# Patient Record
Sex: MALE | Race: WHITE | URBAN - METROPOLITAN AREA
[De-identification: names, ages, dates, MRNs, and addresses within clinical notes are randomized per-mention and may not be internally consistent; named-entity substitution may affect disease eponyms.]

---

## 2024-10-03 DIAGNOSIS — R26.9 GAIT ABNORMALITY: Primary | ICD-10-CM

## 2024-10-21 ENCOUNTER — TELEPHONE (OUTPATIENT)
Dept: NEUROLOGY | Facility: CLINIC | Age: 74
End: 2024-10-21
Payer: MEDICARE

## 2024-10-21 NOTE — TELEPHONE ENCOUNTER
Returned Mrs. Mcgrath call regarding the test patient needed.  Since the referral on file does not show what type of test or the referring doctors name we have not schedule.  Ask her to tell me what test was ordered and who was the doctor.  As soon as information is verified we can schedule.

## 2024-10-22 ENCOUNTER — TELEPHONE (OUTPATIENT)
Dept: NEUROLOGY | Facility: CLINIC | Age: 74
End: 2024-10-22
Payer: MEDICARE

## 2024-10-22 NOTE — TELEPHONE ENCOUNTER
Returned call to Roseline/Dr. Robles's office and asked to have records faxed to us. Spoke with patients wife and scheduled appointment for second opinion on GBS for 11/14/24 at 8 am with Dr. Mancia. Wife states she thought Dr. Robles just wanted EMG. Advised her nurse left a message and said it is for a second opinion and Dr. Robles wants patient evaluated, not an EMG. Patients wife asking if appointment would be covered by insurance. Provided phone number to central [employer] to confirm.

## 2024-10-22 NOTE — TELEPHONE ENCOUNTER
Returned  Francois's call regarding scheduling an EMG study,  I explained, I would need call Dr. Betsy Robles's office to clarify orders, since there is only a Neurology consult.    Called Dr. Robles's office and left message for clarification of what the patient is referred to [facility] and if is for an EMG, asked for order to be faxed.

## 2024-10-22 NOTE — TELEPHONE ENCOUNTER
----- Message from Jada sent at 10/22/2024  1:56 PM CDT -----  Regarding: Needs Medical Advice  Contact: jass with Dr. Robles's office at [phone]  Type: Needs Medical Advice  Who Called:  jass with Dr. Robles's office at [phone]     Additional Information: calling for patient's referral in the system. It is not for an EMG but a second opinion consultation for GBS. Dr. Robles would like the patient evaluated. Please call and advise. Thank you

## 2024-10-28 ENCOUNTER — TELEPHONE (OUTPATIENT)
Dept: NEUROLOGY | Facility: CLINIC | Age: 74
End: 2024-10-28
Payer: MEDICARE

## 2024-11-14 ENCOUNTER — OFFICE VISIT (OUTPATIENT)
Dept: NEUROLOGY | Facility: CLINIC | Age: 74
End: 2024-11-14
Payer: MEDICARE

## 2024-11-14 ENCOUNTER — LAB VISIT (OUTPATIENT)
Dept: LAB | Facility: HOSPITAL | Age: 74
End: 2024-11-14
Attending: PSYCHIATRY & NEUROLOGY
Payer: MEDICARE

## 2024-11-14 VITALS
SYSTOLIC BLOOD PRESSURE: 180 MMHG | RESPIRATION RATE: 17 BRPM | HEIGHT: 60 IN | BODY MASS INDEX: 53.19 KG/M2 | WEIGHT: 270.94 LBS | DIASTOLIC BLOOD PRESSURE: 88 MMHG | HEART RATE: 70 BPM

## 2024-11-14 DIAGNOSIS — R25.1 TREMOR: ICD-10-CM

## 2024-11-14 DIAGNOSIS — G25.9 EXTRAPYRAMIDAL AND MOVEMENT DISORDER, UNSPECIFIED: ICD-10-CM

## 2024-11-14 DIAGNOSIS — R29.6 FALLS: ICD-10-CM

## 2024-11-14 DIAGNOSIS — M54.16 LUMBAR RADICULOPATHY: ICD-10-CM

## 2024-11-14 DIAGNOSIS — R55 SYNCOPE, UNSPECIFIED SYNCOPE TYPE: ICD-10-CM

## 2024-11-14 DIAGNOSIS — M54.2 CERVICALGIA: ICD-10-CM

## 2024-11-14 DIAGNOSIS — R55 SYNCOPE, UNSPECIFIED SYNCOPE TYPE: Primary | ICD-10-CM

## 2024-11-14 DIAGNOSIS — R26.9 GAIT ABNORMALITY: ICD-10-CM

## 2024-11-14 LAB
ALBUMIN SERPL BCP-MCNC: 3.9 G/DL (ref 3.5–5.2)
ALP SERPL-CCNC: 106 U/L (ref 40–150)
ALT SERPL W/O P-5'-P-CCNC: 45 U/L (ref 10–44)
ANION GAP SERPL CALC-SCNC: 10 MMOL/L (ref 8–16)
AST SERPL-CCNC: 51 U/L (ref 10–40)
BILIRUB SERPL-MCNC: 0.8 MG/DL (ref 0.1–1)
BUN SERPL-MCNC: 10 MG/DL (ref 8–23)
CALCIUM SERPL-MCNC: 9.4 MG/DL (ref 8.7–10.5)
CERULOPLASMIN SERPL-MCNC: 19 MG/DL (ref 15–45)
CHLORIDE SERPL-SCNC: 105 MMOL/L (ref 95–110)
CK SERPL-CCNC: 142 U/L (ref 20–200)
CO2 SERPL-SCNC: 26 MMOL/L (ref 23–29)
CREAT SERPL-MCNC: 1.4 MG/DL (ref 0.5–1.4)
EST. GFR  (NO RACE VARIABLE): 52.1 ML/MIN/1.73 M^2
FOLATE SERPL-MCNC: 17.7 NG/ML (ref 4–24)
GLUCOSE SERPL-MCNC: 94 MG/DL (ref 70–110)
POTASSIUM SERPL-SCNC: 4.1 MMOL/L (ref 3.5–5.1)
PROT SERPL-MCNC: 7.6 G/DL (ref 6–8.4)
SODIUM SERPL-SCNC: 141 MMOL/L (ref 136–145)
VIT B12 SERPL-MCNC: 966 PG/ML (ref 210–950)

## 2024-11-14 PROCEDURE — 84446 ASSAY OF VITAMIN E: CPT | Performed by: PSYCHIATRY & NEUROLOGY

## 2024-11-14 PROCEDURE — 36415 COLL VENOUS BLD VENIPUNCTURE: CPT | Mod: PO | Performed by: PSYCHIATRY & NEUROLOGY

## 2024-11-14 PROCEDURE — 82390 ASSAY OF CERULOPLASMIN: CPT | Performed by: PSYCHIATRY & NEUROLOGY

## 2024-11-14 PROCEDURE — 82525 ASSAY OF COPPER: CPT | Performed by: PSYCHIATRY & NEUROLOGY

## 2024-11-14 PROCEDURE — 83921 ORGANIC ACID SINGLE QUANT: CPT | Performed by: PSYCHIATRY & NEUROLOGY

## 2024-11-14 PROCEDURE — 80053 COMPREHEN METABOLIC PANEL: CPT | Performed by: PSYCHIATRY & NEUROLOGY

## 2024-11-14 PROCEDURE — 82607 VITAMIN B-12: CPT | Performed by: PSYCHIATRY & NEUROLOGY

## 2024-11-14 PROCEDURE — 82085 ASSAY OF ALDOLASE: CPT | Performed by: PSYCHIATRY & NEUROLOGY

## 2024-11-14 PROCEDURE — 99213 OFFICE O/P EST LOW 20 MIN: CPT | Mod: PBBFAC,PO | Performed by: PSYCHIATRY & NEUROLOGY

## 2024-11-14 PROCEDURE — 84425 ASSAY OF VITAMIN B-1: CPT | Performed by: PSYCHIATRY & NEUROLOGY

## 2024-11-14 PROCEDURE — 99999 PR PBB SHADOW E&M-EST. PATIENT-LVL III: CPT | Mod: PBBFAC,,, | Performed by: PSYCHIATRY & NEUROLOGY

## 2024-11-14 PROCEDURE — 85025 COMPLETE CBC W/AUTO DIFF WBC: CPT | Performed by: PSYCHIATRY & NEUROLOGY

## 2024-11-14 PROCEDURE — 84630 ASSAY OF ZINC: CPT | Performed by: PSYCHIATRY & NEUROLOGY

## 2024-11-14 PROCEDURE — 82746 ASSAY OF FOLIC ACID SERUM: CPT | Performed by: PSYCHIATRY & NEUROLOGY

## 2024-11-14 PROCEDURE — 82550 ASSAY OF CK (CPK): CPT | Performed by: PSYCHIATRY & NEUROLOGY

## 2024-11-14 NOTE — PATIENT INSTRUCTIONS
Will get blood work today to look for things that might contribute to dizziness or tremor.    Advise a tilt table test with your cardiologist.    You do not have guillain barre syndrome.  It does not appear that your symptoms are occurring due to a neuromuscular disorder.

## 2024-11-14 NOTE — PROGRESS NOTES
NEUROLOGY  Outpatient CONSULT  [facility]  [address] [facility] [address], [place] [address]  [phone] (office) / [phone] (fax)    Patient Name:  Guillermo Parrish  :  1950  MR #:  [id number]  Acct #:  [id number]    Date of Neurology Consult: 2024  Name of Neurologist: Kindra Mancia D.O, ABPN, AOBNP, ABEM    Other Physicians:  No primary care provider on file. (Primary Care Physician); Betsy Robles MD (Referring)      Chief Complaint: Establish Care (Referred by betsy robles, unsteady gait, falls and tremors )      History of Present Illness (HPI):  Guillermo Parrish is a 74 y.o. male here for a second opinion regarding unsteady gait.  He has been following with Dr. Betsy Robles.    Patient's wife provides the history.  He had his 2nd COVID Booster in 2022.  Perfectly normal up until then.  A month later, he fainted and hit his head.  Then he started having poor balance.  He also has dizziness.  He continued to have episodes of syncope.  Since then he has had to use a rollater to walk.  He was seen by ENT and did the balance testing.  This was reportedly normal.  They think he was checked for seizures.  He had an MRI brain that showed signs of an old stroke.    He states he has a lot of trouble with dizziness.  He will get very dizzy even just sitting down watching TV.  He feels like he wants to pass out.  He will feel very lightheaded.  He has not had a tilt table test.  He tried to do a stress test but he almost passed out on the treadmill. He states he feels like he is going to fall, he gets short of breath and loopy.    An example of a fall, they were walking through a parking lot and he had to step up a small step.  He fell backwards trying to go up this step.  At home he was sitting at the bar and fell backwards at the [facility]to.  He was incoherent, his wife had to call 911.  He lost his bladder at the time.  Slowly he came around.    He can take a few steps if there  "is something to hold onto.  If he gets down on the floor he cannot get up.  He will crawl to something to climb up to get to a stand.      He has tremors in both arms.  He reports the shaking occurs at rest.  It comes and goes.  He shakes with activity as well.  He does not know if he has tried anything for tremor.    He also has a history of neck pain.  He was seen by pain management for this.  He had injections for this.  He had bulging discs in his back so had surgery for this. He can get a numbness in his left leg that will come and go, but when it occurs the leg "won't work".  This did not improve with surgery.  He also has some shoulder pain.    He has recently changed his diabetes medication and has been nauseous ever since.  He has not spoken to his PCP about this yet.    They feel he is worse now than he was when this started 2 years ago.  The tremors have escalated.  He can no longer go upstairs in his home.      He has no history of cancer.  He has never had stomach surgery.  He has never been a smoker.  He drinks 3-4 beers a day during the week, on the weekend it is more, this has been this was for decades.  He has no history of vitamin deficiencies, he takes a multivitamin daily.      He has neuropathy in the lower extremities, he recalls this has been present for decades.  He states he feels the numbness in the same area he has skin changes on his legs.    Treatment to date:    Not clear what he has tried    Review of Systems:   General: Weight gain: Yes, Weight Loss: No, Fatigue: Yes,   Fever: No, Chills: No, Night Sweats: No, Insomnia: No, Excessive sleeping: No   Respiratory:  Cough: Yes, Shortness of Breath: Yes,   Wheezing: No, Excessive Snoring: Yes, Coughing up blood: No  Endocrine: Heat Intolerance: No, Cold Intolerance: No,   Excessive Thirst: No, Excessive Hunger: No,   Eyes:  Blurred Vision: Yes, Double Vision: No,   Light Sensitivity: No, Eye pain: Yes  Musculoskeletal: Muscle Aches/Pain: " Yes, Joint Pain/Swelling: No, Muscle Cramps: Yes, Muscle Weakness: Yes, Neck Pain: Yes, Back Pain: No   Neurological: Difficulty Walking/Falls: Yes, Headache Migraine: No, Dizziness/Vertigo: Yes, Fainting: No, Difficulty with Speech: No, Weakness: No, Tingling/Numbness: Yes, Tremors: Yes, Memory Problems: No, Seizures: No, Difficulty Swallowing: No, Altered Taste: No.  Cardiovascular: Chest Pain: Yes, Shortness of Breath: No,   Palpitations: Yes,  Gastrointestinal: Nausea/Vomiting: No, Constipation: No, Diarrhea: No, Bloody Stools: No   Psych/Cog:  Depression: No, Anxiety: No, Hallucinations: No, Problems Concentrating: No  : Frequent Urination: No, Incontinence: No, Urinary Infections: No, Blood of Urine: No,   ENT:Hearing Loss: No, Earache: No, Ringing in Ears: Yes,   Facial Pain: No, Chronic Congestion: No   Immune: Seasonal Allergies: No, Hives and/or Rashes: No  The remainder of the review of twelve body systems was reviewed and normal.    Past Medical, Surgical, Family & Social History:   Past Medical History:   Diagnosis Date    Cancer     Diabetes mellitus     Hearing loss     Hypertension     Neuropathy      Past Surgical History:   Procedure Laterality Date    SINUS SURGERY      SPINE SURGERY       Family History   Problem Relation Name Age of Onset    Stroke Mother      Kidney disease Mother      Diabetes Mother      Heart disease Father      Aneurysm Father      Cancer Maternal Grandmother      Cancer Paternal Grandmother       Alcohol use:  reports current alcohol use of about 4.0 standard drinks of alcohol per week.   (Of note, 0.6 oz = 1 beer or 6 oz = 10 beers).  Tobacco use:  reports that he has been smoking cigarettes. He uses smokeless tobacco.  Street drug use:  has no history on file for drug use.  Allergies: Hydrocodone-acetaminophen.    Home Medications:     Current Outpatient Medications:     aspirin (ECOTRIN) 81 MG EC tablet, Take 81 mg by mouth., Disp: , Rfl:     atorvastatin (LIPITOR) 20  "MG tablet, , Disp: , Rfl:     carvediloL (COREG) 25 MG tablet, Take 25 mg by mouth. (Patient not taking: Reported on 11/14/2024), Disp: , Rfl:     dulaglutide (TRULICITY) 1.5 mg/0.5 mL pen injector, , Disp: , Rfl:     ENTRESTO 49-51 mg per tablet, , Disp: , Rfl:     gabapentin (NEURONTIN) 300 MG capsule, gabapentin 300 mg capsule, Disp: , Rfl:     JANUVIA 100 mg Tab, , Disp: , Rfl:     lisinopriL (PRINIVIL,ZESTRIL) 20 MG tablet, , Disp: , Rfl:     losartan (COZAAR) 100 MG tablet, Take 1 tablet by mouth once daily., Disp: , Rfl:     metroNIDAZOLE (METROGEL) 0.75 % gel, Apply topically 2 (two) times daily., Disp: 60 g, Rfl: 2    multivitamin (THERAGRAN) per tablet, Take 1 tablet by mouth once daily., Disp: , Rfl:     mupirocin (BACTROBAN) 2 % ointment, Apply topically 3 (three) times daily. To the L chest Until healed (Patient not taking: Reported on 11/14/2024), Disp: 22 g, Rfl: 1    NIFEdipine (ADALAT CC) 30 MG TbSR, , Disp: , Rfl:     omeprazole (PRILOSEC) 20 MG capsule, Take 20 mg by mouth., Disp: , Rfl:     PRADAXA 150 mg Cap, , Disp: , Rfl:     ranolazine (RANEXA) 500 MG Tb12, , Disp: , Rfl:     Physical Examination:  BP (!) 180/88 (BP Location: Left arm, Patient Position: Sitting)   Pulse 70   Resp 17   Ht (!) 6" (0.152 m)   Wt 122.9 kg (270 lb 15.1 oz)   BMI 5291.54 kg/m²     GENERAL:  General appearance: Well, non-toxic appearing.  No apparent distress.  Extremities: edema in BLE, left greater than right, also with chronic peripheral skin changes - mild    MENTAL STATUS:  Alertness, attention span & concentration: normal.  Language: normal.  Orientation to self, place & time:  normal.  Memory, recent & remote: normal.  Fund of knowledge: normal.    SPEECH:  Clear and fluent.  Follows complex commands.    CRANIAL NERVES:  Cranial Nerves II-XII were examined.  II - Visual fields: normal.  III, IV, VI: PERRL, EOMI, No ptosis, No nystagmus.  V - Facial sensation: normal.  VII - Face symmetry & mobility: " normal.  VIII - Hearing: normal.  IX, X - Palate: mobile & midline.  XI - Shoulder shrug: normal.  XII - Tongue protrusion: normal.    GROSS MOTOR:  Gait & station: uses a rollater, but walks a short distance without, no foot drop, no circumduction, no spasticity.  Tone: normal.  Abnormal movements: some tremor here and there in the upper extremities, inconsistently present with activity.  Finger-nose & Heel-knee-shin: normal.  Rapid alternating movements & drift: normal.    MUSCLE STRENGTH:       Fascics Atrophy RIGHT    LEFT Atrophy Fascics     5 Deltoids 5       5 Biceps 5       5 Triceps 5       5 Forearm.Pr. 5       5 Inteross. 5                         5 Iliopsoas 5       5 Quads 5       5 Hams 5       5 Dorsiflex 5       5 Plantar Flex 5          REFLEXES:    RIGHT Reflex   LEFT   2+ Biceps 2+   2+ Brachiorad. 2+   2+ Triceps 2+        3 Patellar 3   0 Ankle 0        Down PLANTAR Down     SENSORY:  Sharp touch: absent up to knees bilaterally, absent on anterior thigh but present on posterior thigh above the knee.  Vibration: Normal throughout.      Diagnostic Data Reviewed:   Limited records available today for review: not able to review labs, MRI lumbar spine, ENT assessment    Records from Dr. Robles were reviewed.    Patient has been following with Dr. Robles and has reported several falls associated with loss of consciousness.  He has had urinary incontinence with these episodes.  He reports a history of vivid dreams.  He falls out of bed and wakes up on the floor.  There is reports of tremors, hallucinations, in increasing difficulty walking.  On this exam in March of 2024 he had slightly decreased strength in the left lower extremity, but reflexes were symmetric with intact sensation to light touch.  He had mild terminal tremor.  He was using a walker with small shuffling steps.  Due to episodes of loss of consciousness he had a prolonged EEG which was normal.  He was put on a trial of rivastigmine to  see if he had improvement.  He followed up in March of 2024 and felt the rivastigmine may have helped his tremor some.  His rivastigmine was then increased to 4.5 mg twice a day to see if there was further improvement.  In July 20, 2024 his rivastigmine was again increased 6 mg twice a day because of further improvement.    Routine EEG on 01/21/2023 was reported as normal.  Long-term EEG on 01/21/2023 through 01/21/2023 reported no push-button events, no alerts, no ictal activity and sleep, no interictal activity and sleep, no REM was identified, in wakefulness posterior dominant activity was 8 hertz that attenuated with eye opening.  Atrial fibrillation was noted throughout the study.    MRI cervical spine 3/28/24  FINDINGS:  There are 7 nonrib-bearing cervical vertebrae.  There is straightening of the normal cervical lordosis and trace anterolisthesis of C6 on C7 as well as C7 on T1.  Remaining alignment is unremarkable.  No acute fracture or compression   deformity.  No aggressive focal signal abnormality.  Mild edema noted in the left C4-C5 articulating facets as well as the right C5-C6 articulating facets.  Faint edema also noted in the bilateral C6-C7 articulating facets.   Visualized posterior fossa is unremarkable.  Craniocervical junction is well-maintained.   C2-C3: Small disc osteophyte complex.  Right greater than left facet arthropathy with ligamenta flava thickening.  Mild to moderate spinal canal stenosis.  No gross cord compression or intramedullary signal abnormality.  Severe bilateral neural foraminal    stenosis.   C3-C4: Bilateral uncovertebral hypertrophy and disc osteophyte complex.  Right greater left facet arthropathy with ligamenta flava thickening.  Moderate spinal canal stenosis with circumferential effacement of the thecal sac.  No gross cord compression   or intramedullary signal normality.  Severe bilateral neuroforaminal stenosis.   C4-C5: Mild asymmetric to the right disc osteophyte  complex.  Left and right facet arthropathy with ligamenta flava thickening.  Mild spinal canal stenosis.  No gross cord compression or intramedullary signal abnormality.  Moderate left greater than   right neuroforaminal stenosis.   C5-C6: Asymmetric to the right disc osteophyte complex.  Right greater than left facet arthropathy with ligamenta flava thickening.  Mild to moderate spinal canal stenosis.  No gross cord compression or intramedullary signal abnormality.  Moderate to   severe right and mild left neuroforaminal stenosis.   C6-C7: Small disc osteophyte complex.  Right greater than left facet arthropathy.  Mild spinal canal stenosis.  No gross cord compression or intramedullary signal abnormality.  Moderate right and mild left neuroforaminal stenosis.   C7-T1: Trace disc osteophyte complex.  Left greater than right facet arthropathy.  No significant spinal canal or neuroforaminal stenosis.   Visualized soft tissues are unremarkable.   IMPRESSION: Straightening of the normal cervical lordosis and trace anterolisthesis of C6 on C7 as well as C7 on T1.   Multilevel degenerative changes as detailed above including mild edema in the left C4-C5 articulating facets, right C5-C6 articulating facets, and bilateral C6-C7 articulating facets may represent potential source of pain.   Varying degrees of mild to moderate spinal canal stenosis as detailed above, most significant at C3-C4.  No gross cord compression or intramedullary signal abnormality.   Varying degrees of bilateral neural foraminal stenosis as detailed above, moderate to severe at multiple levels.     MRI brain 1/22/24  FINDINGS:  Prominence of ventricular system and cortical sulci.  There is no evidence of acute intracranial hemorrhage or infarct.  There is no evidence of mass, mass effect, or midline shift.  There is no abnormal enhancement of the parenchyma or the   meninges.  Confluent periventricular and deep white matter T2 hyperintensities are  present. Focal area of signal abnormality in the right thalamus consistent with an old lacunar infarct. Basal cisterns appear patent. Gray-white matter differentiation is   preserved. No intra-axial or extra-axial fluid collections.  The visualized orbits are normal in appearance.  Right mastoid effusion  Normal flow voids are present. There is an expanded appearance of the frontal sinus with heterogeneous high T1 and FLAIR   signal and predominantly low T2 signal. No enhancement is apparent. This is consistent with a chronic perhaps postoperative defect. There is no demonstrable meningocele or encephalocele.       Assessment and Plan:  Guillermo Parrish is a 74 y.o. 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      He is mainly falling backwards.  This is the type of fall often seen in movement disorders or with syncope.  This is not the type of fall described in neuropathy or muscle disorders which tend to be forward.    He is losing consciousness, though not with every fall.  He has had profound confusion with his falls but eventually comes around.      He has neck pain and has been seen by pain management.  I cannot see the imaging, but per report he has cervical stenosis at C3-4 that is mild to moderate.  Cervical stenosis can be associated with falls, but he lacks myelopathy symptoms and only has limited increased reflexes at the patella bilaterally.  MRI thoracic spine can be considered due to the increased patellar reflexes, but he does not report bowel and bladder symptoms that often accompany cord compression in this region.  He has no spasticity and is not reporting stiffening episodes, so it would seem this issue is unrelated to his chief complaint.    His exam reveals chronic lower  "limb findings with edema and skin changes consistent with chronic peripheral neuropathy and diabetes.  He has stocking pattern pinprick loss only in the lower limbs distal to the knee.  Above the knee the sensory pattern is consistent with L3/4 sensory disturbance.  I am unable to see anything related to his lumbar spine imaging or what levels his surgery had addressed.  His gait is normal.  There is no sensory disturbance in the upper limbs.  It would not appear this is related to his chief complaint.    The combination of falls and tremors strongly raises suspicion for a movement disorder, this is not my area of expertise.  Movement disorders can be primary neurological issues or can be secondary (extrapyramidal) issues induced by exposure or other medical problems.  Will get some labs today to look into this.  He may need to see a movement disorder specialist for an assessment as well.    He has diabetes, kidney issues and hypertension.  This could be a result of a dysautonomia.  It seems a little less likely since he gets unprovoked lightheadedness, ie sitting watching TV, but would still advise proceeding with a workup.  Will get labs today regarding this and advise a tilt table test.  Will copy his cardiologist on today's assessment.      He reports this all began after his 2nd COVID vaccination.  He was also concerned because they had a friend "with similar symptoms diagnosed with Guillain Dexter Syndrome".  I can verify this is not Guillain Dexter Syndrome and never was.  His symptoms, physical exam and timeline do not raise suspicion for and are inconsistent with GBS.  As for the relationship to the vaccine, it is difficult to say given that it is not yet clear what the problem is.  For the most part, conditions that have been reported to be a consequence of the vaccine from a neurological perspective have been reported to improve over time, not worsen.  Mainly, it is just too soon to say.    Will send a copy " of today's visit to Dr. Robles, Dr. Patton and Dr. Wallis to update them on my findings and recommendations.  I did go ahead and get labs today.  I will forward these to his providers as well when they come in.  The remainder of the recommendations are above.  I appreciate the opportunity to assist in this patient's care.      Important to note, also  has a past medical history of Cancer, Diabetes mellitus, Hearing loss, Hypertension, and Neuropathy.    Time Spent: I spent a total of 100 minutes on the day of the visit.This includes face to face time and non-face to face time preparing to see the patient (eg, review of tests), Obtaining and/or reviewing separately obtained history, Documenting clinical information in the electronic or other health record, Independently interpreting resultsand communicating results to the patient/family/caregiver, or Care coordination.         Kindra Mancia D.O, ABPN, AOBNP, ABWILLI

## 2024-11-15 LAB
ALDOLASE SERPL-CCNC: 3.4 U/L (ref 1.2–7.6)
BASOPHILS # BLD AUTO: 0.03 K/UL (ref 0–0.2)
BASOPHILS NFR BLD: 0.6 % (ref 0–1.9)
DIFFERENTIAL METHOD BLD: ABNORMAL
EOSINOPHIL # BLD AUTO: 0.1 K/UL (ref 0–0.5)
EOSINOPHIL NFR BLD: 1.2 % (ref 0–8)
ERYTHROCYTE [DISTWIDTH] IN BLOOD BY AUTOMATED COUNT: 15 % (ref 11.5–14.5)
HCT VFR BLD AUTO: 45.5 % (ref 40–54)
HGB BLD-MCNC: 14.2 G/DL (ref 14–18)
IMM GRANULOCYTES # BLD AUTO: 0.01 K/UL (ref 0–0.04)
IMM GRANULOCYTES NFR BLD AUTO: 0.2 % (ref 0–0.5)
LYMPHOCYTES # BLD AUTO: 1.1 K/UL (ref 1–4.8)
LYMPHOCYTES NFR BLD: 21.7 % (ref 18–48)
MCH RBC QN AUTO: 29.4 PG (ref 27–31)
MCHC RBC AUTO-ENTMCNC: 31.2 G/DL (ref 32–36)
MCV RBC AUTO: 94 FL (ref 82–98)
MONOCYTES # BLD AUTO: 0.5 K/UL (ref 0.3–1)
MONOCYTES NFR BLD: 9.8 % (ref 4–15)
NEUTROPHILS # BLD AUTO: 3.4 K/UL (ref 1.8–7.7)
NEUTROPHILS NFR BLD: 66.5 % (ref 38–73)
NRBC BLD-RTO: 0 /100 WBC
PLATELET # BLD AUTO: 93 K/UL (ref 150–450)
PMV BLD AUTO: 12.2 FL (ref 9.2–12.9)
RBC # BLD AUTO: 4.83 M/UL (ref 4.6–6.2)
WBC # BLD AUTO: 5.12 K/UL (ref 3.9–12.7)

## 2024-11-18 LAB
CLINICAL BIOCHEMIST REVIEW: NORMAL
PLPETH BLD-MCNC: 171 NG/ML
POPETH BLD-MCNC: 188 NG/ML
ZINC SERPL-MCNC: 77 UG/DL (ref 60–130)

## 2024-11-19 LAB
A-TOCOPHEROL VIT E SERPL-MCNC: 1376 UG/DL (ref 500–1800)
COPPER SERPL-MCNC: 547 UG/L (ref 665–1480)
METHYLMALONATE SERPL-SCNC: 0.27 UMOL/L

## 2024-11-20 LAB — VIT B1 BLD-MCNC: 77 UG/L (ref 38–122)

## 2024-11-21 LAB
ANNOTATION COMMENT IMP: NORMAL
AP3B2 IFA: NEGATIVE
CASPR2-IGG CBA: NEGATIVE
CV2 IGG TITR SER: NEGATIVE {TITER}
DPPX RECEPTOR AB, CBA IFA: NEGATIVE
DYSAUTONOMIA,INTERPRETATION,S: NORMAL
HU1 AB TITR SER: NEGATIVE {TITER}
LGI1-IGG CBA: NEGATIVE
PURKINJE CELL CYTOPLASMIC AB TYPE2: NEGATIVE